# Patient Record
Sex: MALE | Race: BLACK OR AFRICAN AMERICAN | NOT HISPANIC OR LATINO | Employment: FULL TIME | URBAN - METROPOLITAN AREA
[De-identification: names, ages, dates, MRNs, and addresses within clinical notes are randomized per-mention and may not be internally consistent; named-entity substitution may affect disease eponyms.]

---

## 2017-11-13 NOTE — PROGRESS NOTES
This note was created by combination of typed  and Dragon dictation.  Transcription errors may be present.  If there are any questions, please contact me.    Assessment & Plan  Numbness of left foot - could this be L5 radiculopathy? No real back pain/posterior leg pain seems to be worse at night.  I favor local nerve impingement. Neuroma? Try wider toe box shoe and insert with cushion and maybe something to spread the toes.  Hold on imaging.  Had B12 done 2014 at time of complaint, negative.  Only thing abn was the a1c.  Not c/w pancreatic CA.  Reassurance.  -     Basic metabolic panel; Future; Expected date: 11/14/2017    Abnormal glucose - check A1c.  I don't think this is DM neuropathy - not symmetric, not constant.  Stop the cold drinks.  -     Hemoglobin A1c; Future; Expected date: 11/14/2017  -     Basic metabolic panel; Future; Expected date: 11/14/2017        Medications Discontinued During This Encounter   Medication Reason    doxycycline (VIBRA-TABS) 100 MG tablet     guaifenesin-codeine 100-10 mg/5 ml (TUSSI-ORGANIDIN NR)  mg/5 mL syrup        Follow-up: No Follow-up on file.      =================================================================      Chief Complaint   Patient presents with    Elbow Pain     right    Leg Pain     left    Foot Pain     left       HPI  Kd is a 42 y.o. male, last appointment with this clinic was Visit date not found.    Former pt of Dr. Sanon.   Hx of low back pain  Flank pain with hnormal CT scan 12/2015    C/o tingling sensation on the left knee distally.  Notes some dark urine.  And discoloration ont he nail. And the right elbow.  The leg started first.  Started in the foot and then moved more to the proximal.  It's the extensor foot and the posterior lower leg. Not the posterior of the thigh or the anterior thigh.  Sometimes the back can have similar symptoms.  No pain with urination or BM.  Has been seen for similar symptoms 2014.  Plan was MRI  but never got that done.  Worse at the end of the day.  Seems to be more over the great toe and 2nd digit metatarsal areas.    Looked it up and was worried this could be warning sign of pancreatic CA.  Digestion is normal.  No abd pain.  Notes weight will fluctuate up and down.   No fatigue.  BMs are normal.  No EtOH.  Never smoker.      Right elbow pain - extensor side.  He's a .  Right handed.  X a month or so. Tried a sleeve with some relief.    Does drink several cold drinks at night after work.   Abn A1c 2014.     Patient Care Team:  Karan Terry MD as PCP - General (Internal Medicine)    Patient Active Problem List    Diagnosis Date Noted    Lumbar radicular pain 01/10/2014    Disturbance of skin sensation 01/10/2014    Family history of diabetes mellitus 01/10/2014    Family history of ischemic heart disease 01/10/2014       PAST MEDICAL HISTORY:  Past Medical History:   Diagnosis Date    Lumbar radicular pain 1/10/2014       PAST SURGICAL HISTORY:  Past Surgical History:   Procedure Laterality Date    NO PAST SURGERIES       Family History   Problem Relation Age of Onset    Diabetes Mother     Heart disease Father     Heart failure Father     No Known Problems Sister     No Known Problems Brother     No Known Problems Brother     No Known Problems Sister     No Known Problems Son     No Known Problems Daughter     No Known Problems Daughter     COPD Neg Hx     Stroke Neg Hx        SOCIAL HISTORY:  Social History     Social History    Marital status:      Spouse name: N/A    Number of children: N/A    Years of education: N/A     Occupational History    .  [employer] a lot      Social History Main Topics    Smoking status: Never Smoker    Smokeless tobacco: Not on file    Alcohol use No    Drug use: No    Sexual activity: Yes     Partners: Female     Birth control/ protection: None     Other Topics Concern    Not on file     Social History Narrative    No narrative  "on file       ALLERGIES AND MEDICATIONS: updated and reviewed.  Review of patient's allergies indicates:  No Known Allergies  No current outpatient prescriptions on file.     No current facility-administered medications for this visit.        Review of Systems   Constitutional: Negative for chills, fever, malaise/fatigue and weight loss.   HENT: Negative for congestion.    Eyes: Negative for blurred vision and pain.   Respiratory: Negative for shortness of breath and wheezing.    Cardiovascular: Negative for chest pain, palpitations and leg swelling.   Gastrointestinal: Negative for abdominal pain and diarrhea.   Genitourinary: Negative for dysuria, hematuria and urgency.   Neurological: Negative for tingling, focal weakness, weakness and headaches.       Physical Exam   Vitals:    11/14/17 1029   BP: 119/82   Pulse: 72   Temp: 98 °F (36.7 °C)   SpO2: 98%   Weight: 87.8 kg (193 lb 9 oz)   Height: 5' 8" (1.727 m)    Body mass index is 29.43 kg/m².  Weight: 87.8 kg (193 lb 9 oz)   Height: 5' 8" (172.7 cm)     Physical Exam   Constitutional: He is oriented to person, place, and time. He appears well-developed and well-nourished. No distress.   Eyes: EOM are normal.   Neck: No thyromegaly present.   Cardiovascular: Normal rate, regular rhythm and normal heart sounds.    No murmur heard.  Left foot DP, PT pulses 3+   Pulmonary/Chest: Effort normal and breath sounds normal.   Abdominal: He exhibits no distension. There is no tenderness. There is no guarding.   Musculoskeletal: Normal range of motion. He exhibits no edema.   L spine nontender.  Straight leg raise without pain   Lymphadenopathy:     He has no cervical adenopathy.   Neurological: He is alert and oriented to person, place, and time. Coordination normal.   Monofilament exam left foot intact   Skin: Skin is warm and dry.   Psychiatric: He has a normal mood and affect. His behavior is normal. Thought content normal.     "

## 2017-11-14 ENCOUNTER — LAB VISIT (OUTPATIENT)
Dept: LAB | Facility: HOSPITAL | Age: 42
End: 2017-11-14
Attending: INTERNAL MEDICINE
Payer: COMMERCIAL

## 2017-11-14 ENCOUNTER — OFFICE VISIT (OUTPATIENT)
Dept: FAMILY MEDICINE | Facility: CLINIC | Age: 42
End: 2017-11-14
Payer: COMMERCIAL

## 2017-11-14 VITALS
BODY MASS INDEX: 29.34 KG/M2 | HEIGHT: 68 IN | WEIGHT: 193.56 LBS | DIASTOLIC BLOOD PRESSURE: 82 MMHG | OXYGEN SATURATION: 98 % | HEART RATE: 72 BPM | SYSTOLIC BLOOD PRESSURE: 119 MMHG | TEMPERATURE: 98 F

## 2017-11-14 DIAGNOSIS — R20.0 NUMBNESS OF LEFT FOOT: ICD-10-CM

## 2017-11-14 DIAGNOSIS — R73.09 ABNORMAL GLUCOSE: ICD-10-CM

## 2017-11-14 DIAGNOSIS — R20.0 NUMBNESS OF LEFT FOOT: Primary | ICD-10-CM

## 2017-11-14 LAB
ANION GAP SERPL CALC-SCNC: 7 MMOL/L
BUN SERPL-MCNC: 13 MG/DL
CALCIUM SERPL-MCNC: 9.7 MG/DL
CHLORIDE SERPL-SCNC: 103 MMOL/L
CO2 SERPL-SCNC: 28 MMOL/L
CREAT SERPL-MCNC: 0.9 MG/DL
EST. GFR  (AFRICAN AMERICAN): >60 ML/MIN/1.73 M^2
EST. GFR  (NON AFRICAN AMERICAN): >60 ML/MIN/1.73 M^2
ESTIMATED AVG GLUCOSE: 120 MG/DL
GLUCOSE SERPL-MCNC: 97 MG/DL
HBA1C MFR BLD HPLC: 5.8 %
POTASSIUM SERPL-SCNC: 3.9 MMOL/L
SODIUM SERPL-SCNC: 138 MMOL/L

## 2017-11-14 PROCEDURE — 99214 OFFICE O/P EST MOD 30 MIN: CPT | Mod: S$GLB,,, | Performed by: INTERNAL MEDICINE

## 2017-11-14 PROCEDURE — 80048 BASIC METABOLIC PNL TOTAL CA: CPT

## 2017-11-14 PROCEDURE — 99999 PR PBB SHADOW E&M-EST. PATIENT-LVL III: CPT | Mod: PBBFAC,,, | Performed by: INTERNAL MEDICINE

## 2017-11-14 PROCEDURE — 83036 HEMOGLOBIN GLYCOSYLATED A1C: CPT

## 2017-11-14 PROCEDURE — 36415 COLL VENOUS BLD VENIPUNCTURE: CPT | Mod: PO

## 2017-11-15 NOTE — PROGRESS NOTES
a1c 5.8, previous was higher.  Pre-DM. Done for c/o tingling, I felt this was more due to local impingement.  Stop cold drinks.  Letter to pt.  Repeat in about 6 months.

## 2018-05-30 NOTE — PROGRESS NOTES
This note was created by combination of typed  and Dragon dictation.  Transcription errors may be present.  If there are any questions, please contact me.    Assessment & Plan:   Left leg pain  Chronic midline low back pain with left-sided sciatica  -I do not think this is hip arthropathy.  Not consistent with foot arthritis with knee arthritis.  Borderline A1c 5.8 I do not think this is peripheral neuropathy.  B12 was normal previously.  This is behaving more like sciatica.  Check plain film x-ray of the lumbar spine.  Plan to proceed with MRI lumbar spine and we discussed that.  Depending on the findings may need to see Neurosurgery versus pain management.  -     X-Ray Lumbar Spine Ap And Lateral; Future; Expected date: 05/31/2018    Abnormal glucose, A1c 5.8 in the fall.  Still needs to modify dietary habits.  Hold on blood testing today.    Other orders  -     Cancel: Hemoglobin A1c; Future; Expected date: 05/31/2018  -     Cancel: Basic metabolic panel; Future; Expected date: 05/31/2018        There are no discontinued medications.  Modified Medications    No medications on file     New Prescriptions    No medications on file        Follow Up: No Follow-up on file.        Subjective:     Chief Complaint   Patient presents with    Leg Pain     burning feeling times couple months    back problems     burning       HPI  Kd is a 43 y.o. male, last appointment with this clinic was Visit date not found.    Hx of low back pain  Flank pain with normal CT scan 12/2015  Abn glc/a1c 11/2017    Last seen for foot tingling.  a1c slight high.     He is still having burning, it is in the lower leg, currently not in the foot but now he is having burning sensation in his hip.  He is unable to describe whether the burning sensation is in the anterior or the posterior the lower leg just feels like it is deep in there.  The right anterior hip area.  And burning sensation in his lower back.  No weakness.  Not really a  pain, not not to take medication.  He wonders if this may be arthritis.    He has been seen several times over the past 5 years for pain in the back, had also had similar sensation in the hip for which he had an x-ray done of the hip back in 2014, negative.  No imaging of the back on record.    He stands all day with work boots.    In regards to diet, still with a sweet tooth and weakness for candies, has cut down on sodas but still drinking soda.  Increased his water intake.    Patient Care Team:  Karan Terry MD as PCP - General (Internal Medicine)    Patient Active Problem List    Diagnosis Date Noted    Lumbar radicular pain 01/10/2014    Disturbance of skin sensation 01/10/2014    Family history of diabetes mellitus 01/10/2014    Family history of ischemic heart disease 01/10/2014       PAST MEDICAL HISTORY:  Past Medical History:   Diagnosis Date    Lumbar radicular pain 1/10/2014       PAST SURGICAL HISTORY:  Past Surgical History:   Procedure Laterality Date    NO PAST SURGERIES         SOCIAL HISTORY:  Social History     Social History    Marital status:      Spouse name: N/A    Number of children: N/A    Years of education: N/A     Occupational History    .  [employer] a lot      Social History Main Topics    Smoking status: Never Smoker    Smokeless tobacco: Not on file    Alcohol use No    Drug use: No    Sexual activity: Yes     Partners: Female     Birth control/ protection: None     Other Topics Concern    Not on file     Social History Narrative    No narrative on file       ALLERGIES AND MEDICATIONS: updated and reviewed.  Review of patient's allergies indicates:  No Known Allergies  No current outpatient prescriptions on file.     No current facility-administered medications for this visit.        Review of Systems   All other systems reviewed and are negative.      Objective:   Physical Exam  Vitals:    05/31/18 1049   BP: 108/75   BP Location: Left arm   Patient  "Position: Sitting   BP Method: Medium (Automatic)   Pulse: 98   Temp: 98.3 °F (36.8 °C)   TempSrc: Oral   SpO2: 97%   Weight: 89.9 kg (198 lb 3.1 oz)   Height: 5' 8" (1.727 m)    Body mass index is 30.14 kg/m².  Weight: 89.9 kg (198 lb 3.1 oz)   Height: 5' 8" (172.7 cm)     Physical Exam   Constitutional: He is oriented to person, place, and time. He appears well-developed and well-nourished.   Eyes: No scleral icterus.   Musculoskeletal:   Lumbar spine unremarkable.  Straight leg raise without discomfort.  Hip inversion and eversion without pain. Plantar flexion 10 times against body weight resistance without weakness.  Dorsiflexion of the left foot great toe against resistance without weakness   Neurological: He is alert and oriented to person, place, and time.   Patellar DTR 1+ and symmetric.  Achilles DTR 1+ and symmetric.   Skin: No rash noted.   Psychiatric: He has a normal mood and affect. His behavior is normal. Thought content normal.     "

## 2018-05-31 ENCOUNTER — OFFICE VISIT (OUTPATIENT)
Dept: FAMILY MEDICINE | Facility: CLINIC | Age: 43
End: 2018-05-31
Payer: COMMERCIAL

## 2018-05-31 ENCOUNTER — HOSPITAL ENCOUNTER (OUTPATIENT)
Dept: RADIOLOGY | Facility: HOSPITAL | Age: 43
Discharge: HOME | End: 2018-05-31
Attending: INTERNAL MEDICINE
Payer: COMMERCIAL

## 2018-05-31 ENCOUNTER — TELEPHONE (OUTPATIENT)
Dept: FAMILY MEDICINE | Facility: CLINIC | Age: 43
End: 2018-05-31

## 2018-05-31 VITALS
HEIGHT: 68 IN | OXYGEN SATURATION: 97 % | HEART RATE: 98 BPM | DIASTOLIC BLOOD PRESSURE: 75 MMHG | WEIGHT: 198.19 LBS | BODY MASS INDEX: 30.04 KG/M2 | TEMPERATURE: 98 F | SYSTOLIC BLOOD PRESSURE: 108 MMHG

## 2018-05-31 DIAGNOSIS — M54.42 CHRONIC MIDLINE LOW BACK PAIN WITH LEFT-SIDED SCIATICA: ICD-10-CM

## 2018-05-31 DIAGNOSIS — M54.16 LUMBAR RADICULOPATHY, CHRONIC: ICD-10-CM

## 2018-05-31 DIAGNOSIS — G89.29 CHRONIC MIDLINE LOW BACK PAIN WITH LEFT-SIDED SCIATICA: ICD-10-CM

## 2018-05-31 DIAGNOSIS — M79.605 LEFT LEG PAIN: Primary | ICD-10-CM

## 2018-05-31 PROCEDURE — 72100 X-RAY EXAM L-S SPINE 2/3 VWS: CPT | Mod: TC,FY,PO

## 2018-05-31 PROCEDURE — 99214 OFFICE O/P EST MOD 30 MIN: CPT | Mod: S$GLB,,, | Performed by: INTERNAL MEDICINE

## 2018-05-31 PROCEDURE — 72100 X-RAY EXAM L-S SPINE 2/3 VWS: CPT | Mod: 26,,, | Performed by: RADIOLOGY

## 2018-05-31 PROCEDURE — 3008F BODY MASS INDEX DOCD: CPT | Mod: CPTII,S$GLB,, | Performed by: INTERNAL MEDICINE

## 2018-05-31 PROCEDURE — 99999 PR PBB SHADOW E&M-EST. PATIENT-LVL III: CPT | Mod: PBBFAC,,, | Performed by: INTERNAL MEDICINE

## 2018-05-31 NOTE — TELEPHONE ENCOUNTER
Spoke w/patient informed of results and orders for MRI. Advised patient that he will receive a call for scheduling. Pt had no questions or concerns. Verbalized understanding.

## 2018-05-31 NOTE — TELEPHONE ENCOUNTER
----- Message from Karan Terry MD sent at 5/31/2018 12:43 PM CDT -----  Modest degenerative change as above.    Please call pt - his XR shows no fracture but does show arthritis in the spine.  I am going to get an MRI of the spine to see if a nerve is being pinched.  We talked about that at his visit.

## 2018-05-31 NOTE — PROGRESS NOTES
Modest degenerative change as above.    Please call pt - his XR shows no fracture but does show arthritis in the spine.  I am going to get an MRI of the spine to see if a nerve is being pinched.  We talked about that at his visit.

## 2018-10-18 ENCOUNTER — OFFICE VISIT (OUTPATIENT)
Dept: FAMILY MEDICINE | Facility: CLINIC | Age: 43
End: 2018-10-18
Payer: COMMERCIAL

## 2018-10-18 ENCOUNTER — LAB VISIT (OUTPATIENT)
Dept: LAB | Facility: HOSPITAL | Age: 43
End: 2018-10-18
Payer: COMMERCIAL

## 2018-10-18 VITALS
OXYGEN SATURATION: 95 % | WEIGHT: 187.38 LBS | HEART RATE: 60 BPM | BODY MASS INDEX: 28.4 KG/M2 | TEMPERATURE: 98 F | HEIGHT: 68 IN | DIASTOLIC BLOOD PRESSURE: 66 MMHG | SYSTOLIC BLOOD PRESSURE: 104 MMHG

## 2018-10-18 DIAGNOSIS — Z83.3 FAMILY HISTORY OF DIABETES MELLITUS: ICD-10-CM

## 2018-10-18 DIAGNOSIS — G89.29 CHRONIC MIDLINE LOW BACK PAIN WITH LEFT-SIDED SCIATICA: Primary | ICD-10-CM

## 2018-10-18 DIAGNOSIS — M54.42 CHRONIC MIDLINE LOW BACK PAIN WITH LEFT-SIDED SCIATICA: Primary | ICD-10-CM

## 2018-10-18 LAB
ALBUMIN SERPL BCP-MCNC: 4.2 G/DL
ALP SERPL-CCNC: 49 U/L
ALT SERPL W/O P-5'-P-CCNC: 17 U/L
ANION GAP SERPL CALC-SCNC: 5 MMOL/L
AST SERPL-CCNC: 19 U/L
BILIRUB SERPL-MCNC: 0.4 MG/DL
BUN SERPL-MCNC: 10 MG/DL
CALCIUM SERPL-MCNC: 9.5 MG/DL
CHLORIDE SERPL-SCNC: 104 MMOL/L
CO2 SERPL-SCNC: 30 MMOL/L
CREAT SERPL-MCNC: 1 MG/DL
EST. GFR  (AFRICAN AMERICAN): >60 ML/MIN/1.73 M^2
EST. GFR  (NON AFRICAN AMERICAN): >60 ML/MIN/1.73 M^2
ESTIMATED AVG GLUCOSE: 117 MG/DL
GLUCOSE SERPL-MCNC: 108 MG/DL
HBA1C MFR BLD HPLC: 5.7 %
POTASSIUM SERPL-SCNC: 4.4 MMOL/L
PROT SERPL-MCNC: 7.2 G/DL
SODIUM SERPL-SCNC: 139 MMOL/L

## 2018-10-18 PROCEDURE — 83036 HEMOGLOBIN GLYCOSYLATED A1C: CPT

## 2018-10-18 PROCEDURE — 36415 COLL VENOUS BLD VENIPUNCTURE: CPT | Mod: PO

## 2018-10-18 PROCEDURE — 80053 COMPREHEN METABOLIC PANEL: CPT

## 2018-10-18 PROCEDURE — 99214 OFFICE O/P EST MOD 30 MIN: CPT | Mod: S$GLB,,, | Performed by: NURSE PRACTITIONER

## 2018-10-18 PROCEDURE — 99999 PR PBB SHADOW E&M-EST. PATIENT-LVL III: CPT | Mod: PBBFAC,,, | Performed by: NURSE PRACTITIONER

## 2018-10-18 PROCEDURE — 3008F BODY MASS INDEX DOCD: CPT | Mod: CPTII,S$GLB,, | Performed by: NURSE PRACTITIONER

## 2018-10-18 NOTE — PROGRESS NOTES
Subjective:       Patient ID: Kd David is a 43 y.o. male.    Chief Complaint: Groin Pain (Left); Back Pain (Lower); and Labs Only    43-year-old male presents to the clinic today with complaint of continued lower back pain off and on which has been a chronic problem for a long time.   ordered a MRI several months ago which he never had.  He says now he is ready to schedule his MRI.  He was given the number to Radiology to call and schedule it.  Presently, he denies any pain today.  He occasionally has tingling in the top of his left leg.  She also has some mild left groin pain at times.  He denies any pain, numbness, or weakness to lower extremities.  He denies any urinary or bowel incontinence.  He states that he hurt his back 4-5 years ago while lifting something at work.  He does not recall having any herniated or slipped disc that he knows of.  He states he has not been eating well lately.  He would like to have his blood sugar and  hemoglobin A1c checked.  He has family history of diabetes and was told he was borderline diabetic in the past.      Past Medical History:   Diagnosis Date    Lumbar radicular pain 1/10/2014     Past Surgical History:   Procedure Laterality Date    NO PAST SURGERIES        reports that  has never smoked. He does not have any smokeless tobacco history on file. He reports that he does not drink alcohol or use drugs.  Review of Systems   Constitutional: Negative for chills, fatigue and fever.   HENT: Negative for congestion, ear discharge, ear pain, postnasal drip, rhinorrhea, sinus pressure, sinus pain and sore throat.    Eyes: Negative for pain, discharge and itching.   Respiratory: Negative for cough, shortness of breath and wheezing.    Cardiovascular: Negative for chest pain, palpitations and leg swelling.   Gastrointestinal: Negative for abdominal pain, blood in stool, constipation, diarrhea, nausea and vomiting.   Genitourinary:        Denies any urinary or  bowel incontinence    Musculoskeletal: Positive for back pain. Negative for gait problem, neck pain and neck stiffness.        Left groin pain at times    Skin: Negative for rash.   Neurological: Negative for dizziness, weakness, light-headedness, numbness and headaches.        Tingling left upper leg at times        Objective:      Physical Exam   Constitutional: He is oriented to person, place, and time. He appears well-developed and well-nourished. No distress.   Eyes: Conjunctivae and EOM are normal. Pupils are equal, round, and reactive to light. Right eye exhibits no discharge. Left eye exhibits no discharge. No scleral icterus.   Cardiovascular: Normal rate, regular rhythm and normal heart sounds. Exam reveals no gallop and no friction rub.   No murmur heard.  Pulmonary/Chest: Effort normal and breath sounds normal. No respiratory distress. He has no wheezes.   Abdominal: Soft. Bowel sounds are normal. There is no tenderness.   Musculoskeletal: Normal range of motion. He exhibits no edema or tenderness.   Spine non-tender to palpation para-spinal muscles non-tender to palpation FROM back without any difficulty    Neurological: He is alert and oriented to person, place, and time. He displays normal reflexes.   Negative leg raises FROM to right hip without any difficulty if turns left hip outward he has mild left groin discomfort    Skin: Skin is warm and dry. He is not diaphoretic.       Assessment:       1. Chronic midline low back pain with left-sided sciatica    2. Family history of diabetes mellitus        Plan:       Chronic midline low back pain with left-sided sciatica  - recommend calling and scheduling MRI as ordered per Dr. Terry     Family history of diabetes mellitus  -     Hemoglobin A1c; Future; Expected date: 10/18/2018  -     Comprehensive metabolic panel; Future; Expected date: 10/18/2018    He is on [facility]

## 2018-10-23 ENCOUNTER — TELEPHONE (OUTPATIENT)
Dept: FAMILY MEDICINE | Facility: CLINIC | Age: 43
End: 2018-10-23

## 2018-10-26 ENCOUNTER — TELEPHONE (OUTPATIENT)
Dept: FAMILY MEDICINE | Facility: CLINIC | Age: 43
End: 2018-10-26

## 2018-10-26 NOTE — TELEPHONE ENCOUNTER
----- Message from Emily Ching sent at 10/26/2018  3:19 PM CDT -----  Contact: Self   Patient called to get his lab results. Please call at [phone]

## 2018-10-26 NOTE — TELEPHONE ENCOUNTER
----- Message from Caroline Rodriguez sent at 10/26/2018  3:44 PM CDT -----  Contact: Self/ [phone]  Pt returning call to office. Thank you.

## 2018-10-26 NOTE — TELEPHONE ENCOUNTER
Spoke with pt informed of results.Pt verbalized understanding. Pt states no questions or concerns at this time.   Your CMP was normal. Your fasting glucose was 108 which is normal. Your Hgb A1C was 5.7 which is normal. Your previous Hgb A1C was 5.8.   Sincerely,   Brendon Kiran NP

## 2019-04-08 ENCOUNTER — HOSPITAL ENCOUNTER (EMERGENCY)
Facility: HOSPITAL | Age: 44
Discharge: HOME | End: 2019-04-08
Attending: EMERGENCY MEDICINE
Payer: COMMERCIAL

## 2019-04-08 VITALS
DIASTOLIC BLOOD PRESSURE: 70 MMHG | RESPIRATION RATE: 18 BRPM | OXYGEN SATURATION: 97 % | BODY MASS INDEX: 28.64 KG/M2 | TEMPERATURE: 99 F | HEART RATE: 93 BPM | HEIGHT: 68 IN | SYSTOLIC BLOOD PRESSURE: 113 MMHG | WEIGHT: 189 LBS

## 2019-04-08 DIAGNOSIS — N39.0 URINARY TRACT INFECTION WITHOUT HEMATURIA, SITE UNSPECIFIED: Primary | ICD-10-CM

## 2019-04-08 LAB
BACTERIA #/AREA URNS HPF: ABNORMAL /HPF
BILIRUB UR QL STRIP: NEGATIVE
CLARITY UR: CLEAR
COLOR UR: YELLOW
CTP QC/QA: YES
FLUAV AG NPH QL: NEGATIVE
FLUBV AG NPH QL: NEGATIVE
GLUCOSE UR QL STRIP: NEGATIVE
HGB UR QL STRIP: ABNORMAL
KETONES UR QL STRIP: NEGATIVE
LEUKOCYTE ESTERASE UR QL STRIP: ABNORMAL
MICROSCOPIC COMMENT: ABNORMAL
NITRITE UR QL STRIP: NEGATIVE
PH UR STRIP: 5 [PH] (ref 5–8)
PROT UR QL STRIP: NEGATIVE
RBC #/AREA URNS HPF: 3 /HPF (ref 0–4)
SP GR UR STRIP: 1.01 (ref 1–1.03)
SQUAMOUS #/AREA URNS HPF: 5 /HPF
URN SPEC COLLECT METH UR: ABNORMAL
UROBILINOGEN UR STRIP-ACNC: NEGATIVE EU/DL
WBC #/AREA URNS HPF: 15 /HPF (ref 0–5)

## 2019-04-08 PROCEDURE — 25000003 PHARM REV CODE 250: Performed by: NURSE PRACTITIONER

## 2019-04-08 PROCEDURE — 87186 SC STD MICRODIL/AGAR DIL: CPT

## 2019-04-08 PROCEDURE — 87088 URINE BACTERIA CULTURE: CPT

## 2019-04-08 PROCEDURE — 96372 THER/PROPH/DIAG INJ SC/IM: CPT

## 2019-04-08 PROCEDURE — 87077 CULTURE AEROBIC IDENTIFY: CPT

## 2019-04-08 PROCEDURE — 87086 URINE CULTURE/COLONY COUNT: CPT

## 2019-04-08 PROCEDURE — 99284 EMERGENCY DEPT VISIT MOD MDM: CPT | Mod: 25

## 2019-04-08 PROCEDURE — 63600175 PHARM REV CODE 636 W HCPCS: Performed by: NURSE PRACTITIONER

## 2019-04-08 PROCEDURE — 81000 URINALYSIS NONAUTO W/SCOPE: CPT

## 2019-04-08 RX ORDER — ACETAMINOPHEN AND CODEINE PHOSPHATE 300; 30 MG/1; MG/1
1 TABLET ORAL
Status: DISCONTINUED | OUTPATIENT
Start: 2019-04-08 | End: 2019-04-08

## 2019-04-08 RX ORDER — ACETAMINOPHEN AND CODEINE PHOSPHATE 300; 30 MG/1; MG/1
1 TABLET ORAL EVERY 6 HOURS PRN
Qty: 12 TABLET | Refills: 0 | Status: SHIPPED | OUTPATIENT
Start: 2019-04-08 | End: 2019-04-18

## 2019-04-08 RX ORDER — KETOROLAC TROMETHAMINE 30 MG/ML
60 INJECTION, SOLUTION INTRAMUSCULAR; INTRAVENOUS
Status: COMPLETED | OUTPATIENT
Start: 2019-04-08 | End: 2019-04-08

## 2019-04-08 RX ORDER — CIPROFLOXACIN 500 MG/1
500 TABLET ORAL 2 TIMES DAILY
Qty: 20 TABLET | Refills: 0 | Status: SHIPPED | OUTPATIENT
Start: 2019-04-08 | End: 2019-04-18

## 2019-04-08 RX ORDER — DIAZEPAM 5 MG/1
5 TABLET ORAL
Status: COMPLETED | OUTPATIENT
Start: 2019-04-08 | End: 2019-04-08

## 2019-04-08 RX ADMIN — DIAZEPAM 5 MG: 5 TABLET ORAL at 12:04

## 2019-04-08 RX ADMIN — KETOROLAC TROMETHAMINE 60 MG: 30 INJECTION, SOLUTION INTRAMUSCULAR at 12:04

## 2019-04-08 NOTE — ED PROVIDER NOTES
Encounter Date: 4/8/2019       History     Chief Complaint   Patient presents with    Headache     top of head headache since last night.  denies trauma.   denies n/v/d or fever.   did have some muscle spasms last night in back but has resoleved.    Urinary Tract Infection     x 2 days ago found urine to have foul smell w/ white particles.     Patient presents to ER with headache and body aches since last night.  Patient states he also has a foul smell to his urine and it looks like is particles in his urine x2 days.  Patient denies any unprotected sex as he is with the same partner.  Patient denies any nasal congestion, sore throat, coughing,, chest pain, shortness of breath, dizziness.  Patient ambulated into ER        Review of patient's allergies indicates:  No Known Allergies  Past Medical History:   Diagnosis Date    Lumbar radicular pain 1/10/2014     Past Surgical History:   Procedure Laterality Date    NO PAST SURGERIES       Family History   Problem Relation Age of Onset    Diabetes Mother     Heart disease Father     Heart failure Father     No Known Problems Sister     No Known Problems Brother     No Known Problems Brother     No Known Problems Sister     No Known Problems Son     No Known Problems Daughter     No Known Problems Daughter     COPD Neg Hx     Stroke Neg Hx      Social History     Tobacco Use    Smoking status: Never Smoker   Substance Use Topics    Alcohol use: No    Drug use: No     Review of Systems   Constitutional: Positive for chills and fever.   Genitourinary:        Fall smelling urine   Musculoskeletal: Positive for myalgias.   All other systems reviewed and are negative.      Physical Exam     Initial Vitals [04/08/19 1208]   BP Pulse Resp Temp SpO2   111/71 108 18 99.5 °F (37.5 °C) 99 %      MAP       --         Physical Exam    Nursing note and vitals reviewed.  Constitutional: He appears well-developed and well-nourished. He is not diaphoretic.   HENT:    Head: Normocephalic and atraumatic.   Right Ear: External ear normal.   Left Ear: External ear normal.   Nose: Nose normal.   Mouth/Throat: Oropharynx is clear and moist. No oropharyngeal exudate.   Eyes: Conjunctivae and EOM are normal. Pupils are equal, round, and reactive to light. Right eye exhibits no discharge. Left eye exhibits no discharge. No scleral icterus.   Neck: Normal range of motion. Neck supple. No tracheal deviation present. No JVD present.   Cardiovascular: Normal rate, regular rhythm, normal heart sounds and intact distal pulses. Exam reveals no gallop and no friction rub.    No murmur heard.  Pulmonary/Chest: Breath sounds normal. No stridor. No respiratory distress. He has no wheezes. He has no rhonchi. He exhibits no tenderness.   Abdominal: Soft. Bowel sounds are normal. He exhibits no distension and no mass. There is no tenderness. There is no guarding.   Musculoskeletal: Normal range of motion. He exhibits no edema.   Generalized body aches.  No bony tenderness noted   Lymphadenopathy:     He has no cervical adenopathy.   Neurological: He is alert and oriented to person, place, and time. He has normal strength. No cranial nerve deficit.   Skin: Skin is warm and dry. No rash noted. No erythema.   Psychiatric: He has a normal mood and affect. His behavior is normal. Judgment and thought content normal.         ED Course   Procedures  Labs Reviewed   URINALYSIS, REFLEX TO URINE CULTURE          Imaging Results    None          Medical Decision Making:   Differential Diagnosis:   Influenza, urinary tract infection, dysuria, back pain, viral illness                      Clinical Impression:       ICD-10-CM ICD-9-CM   1. Urinary tract infection with hematuria, site unspecified N39.0 599.0                                Susi Jamison, [facility]  04/08/19 1336

## 2019-04-08 NOTE — ED TRIAGE NOTES
Patient reports foul smelling urine with white particles x 2 days. Denies dysuria, hematuria, fever. Also reports headache that started on last night. Denies blurred vision, nausea, vomiting. Reports taking NyQuil last night.

## 2019-04-10 LAB — BACTERIA UR CULT: NORMAL

## 2019-05-25 ENCOUNTER — HOSPITAL ENCOUNTER (EMERGENCY)
Facility: HOSPITAL | Age: 44
Discharge: HOME | End: 2019-05-25
Attending: EMERGENCY MEDICINE
Payer: COMMERCIAL

## 2019-05-25 VITALS
SYSTOLIC BLOOD PRESSURE: 114 MMHG | BODY MASS INDEX: 29.1 KG/M2 | RESPIRATION RATE: 18 BRPM | OXYGEN SATURATION: 95 % | WEIGHT: 192 LBS | DIASTOLIC BLOOD PRESSURE: 67 MMHG | HEIGHT: 68 IN | HEART RATE: 99 BPM | TEMPERATURE: 98 F

## 2019-05-25 DIAGNOSIS — N34.2 URETHRITIS: Primary | ICD-10-CM

## 2019-05-25 PROCEDURE — 25000003 PHARM REV CODE 250: Performed by: PHYSICIAN ASSISTANT

## 2019-05-25 PROCEDURE — 99284 EMERGENCY DEPT VISIT MOD MDM: CPT | Mod: 25

## 2019-05-25 PROCEDURE — 63600175 PHARM REV CODE 636 W HCPCS: Performed by: PHYSICIAN ASSISTANT

## 2019-05-25 PROCEDURE — 87491 CHLMYD TRACH DNA AMP PROBE: CPT

## 2019-05-25 PROCEDURE — 96372 THER/PROPH/DIAG INJ SC/IM: CPT

## 2019-05-25 RX ORDER — CEFTRIAXONE 250 MG/1
250 INJECTION, POWDER, FOR SOLUTION INTRAMUSCULAR; INTRAVENOUS
Status: COMPLETED | OUTPATIENT
Start: 2019-05-25 | End: 2019-05-25

## 2019-05-25 RX ORDER — ONDANSETRON 4 MG/1
4 TABLET, ORALLY DISINTEGRATING ORAL
Status: COMPLETED | OUTPATIENT
Start: 2019-05-25 | End: 2019-05-25

## 2019-05-25 RX ORDER — AZITHROMYCIN 250 MG/1
1000 TABLET, FILM COATED ORAL
Status: COMPLETED | OUTPATIENT
Start: 2019-05-25 | End: 2019-05-25

## 2019-05-25 RX ADMIN — ONDANSETRON 4 MG: 4 TABLET, ORALLY DISINTEGRATING ORAL at 06:05

## 2019-05-25 RX ADMIN — AZITHROMYCIN MONOHYDRATE 1000 MG: 250 TABLET ORAL at 06:05

## 2019-05-25 RX ADMIN — CEFTRIAXONE SODIUM 250 MG: 250 INJECTION, POWDER, FOR SOLUTION INTRAMUSCULAR; INTRAVENOUS at 06:05

## 2019-05-25 NOTE — DISCHARGE INSTRUCTIONS
You were treated for possible gonorrhea and chlamydia today in ER.   You should abstain from sexual intercourse for 1 week to allow infection to clear.     Follow up with primary care or STI clinic for full evaluation.   Return to ER for worsening symptoms or as needed.

## 2019-05-25 NOTE — ED PROVIDER NOTES
Encounter Date: 5/25/2019       History     Chief Complaint   Patient presents with    Penile Discharge     Pt reports he had a discharge today. Pt denies burning with urniaiton.      CC: Penile Discharge    HPI:   44 y/o male with no pertinent past medical history presenting for evaluation penile discharge that began this morning.  Patient reports he has concern for possible sexually transmitted infection a symptoms are similar to when he had chlamydia previously. He denies any known contact.  He denies penile pain, swelling, testicular pain or scrotal swelling, abdominal pain, fever, chills, nausea, vomiting, dysuria, hematuria, urinary urgency or frequency. No attempted treatment.        Review of patient's allergies indicates:  No Known Allergies  Past Medical History:   Diagnosis Date    Lumbar radicular pain 1/10/2014     Past Surgical History:   Procedure Laterality Date    NO PAST SURGERIES       Family History   Problem Relation Age of Onset    Diabetes Mother     Heart disease Father     Heart failure Father     No Known Problems Sister     No Known Problems Brother     No Known Problems Brother     No Known Problems Sister     No Known Problems Son     No Known Problems Daughter     No Known Problems Daughter     COPD Neg Hx     Stroke Neg Hx      Social History     Tobacco Use    Smoking status: Never Smoker   Substance Use Topics    Alcohol use: No    Drug use: No     Review of Systems   Constitutional: Negative for chills and fever.   Eyes: Negative for redness.   Respiratory: Negative for stridor.    Gastrointestinal: Negative for abdominal pain, nausea and vomiting.   Genitourinary: Positive for discharge. Negative for dysuria, frequency, hematuria, penile pain, penile swelling, scrotal swelling, testicular pain and urgency.   Musculoskeletal: Negative for back pain and neck pain.   Skin: Negative for rash.   Neurological: Negative for speech difficulty.   Psychiatric/Behavioral:  Negative for confusion.       Physical Exam     Initial Vitals [05/25/19 1756]   BP Pulse Resp Temp SpO2   116/75 109 18 98.6 °F (37 °C) 96 %      MAP       --         Physical Exam    Nursing note and vitals reviewed.  Constitutional: He appears well-developed and well-nourished. No distress.   HENT:   Head: Normocephalic.   Right Ear: External ear normal.   Left Ear: External ear normal.   Nose: Nose normal.   Eyes: Conjunctivae are normal.   Cardiovascular: Normal rate and regular rhythm. Exam reveals no gallop and no friction rub.    No murmur heard.  Pulmonary/Chest: Breath sounds normal. No respiratory distress. He has no wheezes. He has no rhonchi. He has no rales.   Abdominal: Soft. Bowel sounds are normal. He exhibits no distension. There is no tenderness. There is no rebound and no guarding.   Musculoskeletal: Normal range of motion.   Neurological: He is alert.   Skin: Skin is warm and dry. No rash noted.   Psychiatric: His mood appears anxious.         ED Course   Procedures  Labs Reviewed   C. TRACHOMATIS/N. GONORRHOEAE BY AMP DNA          Imaging Results    None          Medical Decision Making:   Initial Assessment:   43-year-old male presenting for penile discharge that began today.  Patient is afebrile, well-appearing in no distress. Exam above.  Denies any abdominal pain, testicular pain or scrotal swelling, urinary symptoms. Patient is anxious, mildly tachycardic in triage in no distress. Urine GC sent. Will tx empirically for GC. STI or PCP follow up for full evaluation and testing.   Return to ER for worsening symptoms or as needed.                       Clinical Impression:       ICD-10-CM ICD-9-CM   1. Urethritis N34.2 597.80                                Gabi Min PA-C  05/25/19 1815

## 2019-05-25 NOTE — ED TRIAGE NOTES
Pt reports having a small amount of discharge from penis this morning.  Reports having same symptoms when he had chlamydia in the past.  Denies f/c/n/v/d, dysuria, or abdominal pain.

## 2019-05-27 LAB
C TRACH DNA SPEC QL NAA+PROBE: NOT DETECTED
N GONORRHOEA DNA SPEC QL NAA+PROBE: NOT DETECTED

## 2020-10-05 ENCOUNTER — PATIENT MESSAGE (OUTPATIENT)
Dept: ADMINISTRATIVE | Facility: HOSPITAL | Age: 45
End: 2020-10-05

## 2021-01-04 ENCOUNTER — PATIENT MESSAGE (OUTPATIENT)
Dept: ADMINISTRATIVE | Facility: HOSPITAL | Age: 46
End: 2021-01-04

## 2021-04-06 ENCOUNTER — PATIENT MESSAGE (OUTPATIENT)
Dept: ADMINISTRATIVE | Facility: HOSPITAL | Age: 46
End: 2021-04-06

## 2021-04-16 ENCOUNTER — PATIENT MESSAGE (OUTPATIENT)
Dept: RESEARCH | Facility: HOSPITAL | Age: 46
End: 2021-04-16